# Patient Record
Sex: FEMALE | Race: BLACK OR AFRICAN AMERICAN | NOT HISPANIC OR LATINO | URBAN - METROPOLITAN AREA
[De-identification: names, ages, dates, MRNs, and addresses within clinical notes are randomized per-mention and may not be internally consistent; named-entity substitution may affect disease eponyms.]

---

## 2021-04-15 ENCOUNTER — EMERGENCY (EMERGENCY)
Facility: HOSPITAL | Age: 61
LOS: 1 days | Discharge: HOME | End: 2021-04-15
Attending: EMERGENCY MEDICINE | Admitting: EMERGENCY MEDICINE
Payer: COMMERCIAL

## 2021-04-15 VITALS
HEART RATE: 74 BPM | DIASTOLIC BLOOD PRESSURE: 78 MMHG | RESPIRATION RATE: 18 BRPM | OXYGEN SATURATION: 100 % | SYSTOLIC BLOOD PRESSURE: 109 MMHG | TEMPERATURE: 98 F
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
## 2021-04-15 DIAGNOSIS — I10 ESSENTIAL (PRIMARY) HYPERTENSION: ICD-10-CM

## 2021-04-15 DIAGNOSIS — T78.1XXA OTHER ADVERSE FOOD REACTIONS, NOT ELSEWHERE CLASSIFIED, INITIAL ENCOUNTER: ICD-10-CM

## 2021-04-15 DIAGNOSIS — Z88.5 ALLERGY STATUS TO NARCOTIC AGENT: ICD-10-CM

## 2021-04-15 DIAGNOSIS — L50.9 URTICARIA, UNSPECIFIED: ICD-10-CM
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
## 2021-04-15 DIAGNOSIS — J45.909 UNSPECIFIED ASTHMA, UNCOMPLICATED: ICD-10-CM
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
## 2021-04-15 PROCEDURE — 99284 EMERGENCY DEPT VISIT MOD MDM: CPT

## 2021-04-15 RX ORDER — FAMOTIDINE 10 MG/ML
20 INJECTION INTRAVENOUS ONCE
Refills: 0 | Status: COMPLETED | OUTPATIENT
Start: 2021-04-15 | End: 2021-04-15

## 2021-04-15 RX ADMIN — FAMOTIDINE 20 MILLIGRAM(S): 10 INJECTION INTRAVENOUS at 23:27

## 2021-04-15 NOTE — ED PROVIDER NOTE - PHYSICAL EXAMINATION
CONSTITUTIONAL: WD,WN. NAD.    SKIN: Normal color and turgor. Barely appreciable mild redness to R antecubital fossa.   HEAD: NC/AT.  EYES: Conjunctiva clear. EOMI. PERRL.    ENT: Airway patent, no appreciable lymph or intraoral swelling, uvula not swollen, no elevation of FOM, voice is normal.   RESPIRATORY:  Breathing non-labored. No retractions or accessory muscle use.  Lungs CTA bilat.  CARDIOVASCULAR:  RRR, S1S2. No M/R/G.      GI:  Abdomen soft, nontender.    MSK: Neck supple with painless ROM.  No lower extremity edema or calf tenderness.  No joint swelling or ROM limitation.  NEURO: Alert and oriented; CN II-XII grossly intact. Speech clear. 5/5 strength in all extremities.  Good balance. Steady gait. CONSTITUTIONAL: WD,WN. NAD.    SKIN: Normal color and turgor. Barely appreciable mild redness to R antecubital fossa.   HEAD: NC/AT.  EYES: Conjunctiva clear. EOMI. PERRL.    ENT: Airway patent, no appreciable facial, lip, or intraoral swelling, uvula not swollen, no elevation of FOM, voice is normal.   RESPIRATORY:  Breathing non-labored. No retractions or accessory muscle use.  Lungs CTA bilat.  CARDIOVASCULAR:  RRR, S1S2. No M/R/G.      GI:  Abdomen soft, nontender.    MSK: Neck supple with painless ROM.  No lower extremity edema or calf tenderness.  No joint swelling or ROM limitation.  NEURO: Alert and oriented; CN II-XII grossly intact. Speech clear. 5/5 strength in all extremities.  Good balance. Steady gait.

## 2021-04-15 NOTE — ED PROVIDER NOTE - PROGRESS NOTE DETAILS
krishna: stable. ABC intact. asymptomatic. No further evidence of allergic reaction or recurrence. OP clear, no lip swelling, lungs clear. No rash.  Stable for DC.

## 2021-04-15 NOTE — ED PROVIDER NOTE - CLINICAL SUMMARY MEDICAL DECISION MAKING FREE TEXT BOX
Allergic rxn with oral swelling that resolved after meds given by [facility] and EMS.  Asymptomatic in ED.  Stable during obs period in ED.

## 2021-04-15 NOTE — ED ADULT NURSE NOTE - OBJECTIVE STATEMENT
Pt is a 60 y/o female A&Ox4 BIBA from  s/p "allergic reaction." Pt reports "eating a chocolate bar and drinking peppermint tea," s/p pt developed hives to upper body, tongue swelling and throat pain, denies hx of food allergy. Pt received IM 50mg Benadryl from , IV Solumedrol and Epi-pen from EMS. Upon arrival to ED pt reports tongue selling and sore throat resolved and rash improved. PT talking in clear, full sentences, respirations even and unlabored, airway patent, no tongue swelling noted. EKG in progress. Pt is a 60 y/o female A&Ox4 BIBA from  s/p "allergic reaction." Pt reports "eating a chocolate bar and drinking peppermint tea," s/p pt developed hives to upper body, tongue swelling and throat pain, denies hx of food allergy. Pt received IM 50mg Benadryl from , IV Solumedrol and Epi-pen from EMS. Upon arrival to ED pt reports tongue selling resolved, sore throat and rash improved. Pt denies N/V/D, chest pain, SOB. PT talking in clear, full sentences, respirations even and unlabored, airway patent, no tongue swelling noted. EKG in progress.

## 2021-04-15 NOTE — ED PROVIDER NOTE - NSFOLLOWUPINSTRUCTIONS_ED_ALL_ED_FT
Keep EpiPen with you in case of emergency for severe allergic reaction.  Take prednisone 40 mg once daily for next 4 days.  Take Benadryl 25-50 mg every 8 hours for the next 24 hours.    Follow up with your doctor tomorrow; get referral to allergist.     Return to the Emergency Department if you have any new or worsening symptoms, or if you have any concerns.  ===============================    Allergies    WHAT YOU NEED TO KNOW:    Allergies are an immune system reaction to a substance called an allergen. Your immune system sees the allergen as harmful and attacks it. An allergic reaction can be mild or life-threatening. A life-threatening reaction is called anaphylaxis. Anaphylaxis is a sudden, life-threatening reaction that needs immediate treatment.    DISCHARGE INSTRUCTIONS:    Call 911 for signs or symptoms of anaphylaxis, such as trouble breathing, swelling in your mouth or throat, or wheezing. You may also have itching, a rash, hives, or feel like you are going to faint.    Return to the emergency department if:   •You have tingling in your hands or feet.       •Your skin is red or flushed.       Contact your healthcare provider if:   •You have questions or concerns about your condition or care.          Medicines:   •Antihistamines help decrease itching, sneezing, and swelling. You may take them as a pill or use drops in your nose or eyes.       •Decongestants help your nose feel less stuffy.       •Topical treatments help decrease itching or swelling. You also may be given nasal sprays or eyedrops.       •Epinephrine is used to treat a severe allergic reaction such as anaphylaxis.       •Take your medicine as directed. Contact your healthcare provider if you think your medicine is not helping or if you have side effects. Tell him of her if you are allergic to any medicine. Keep a list of the medicines, vitamins, and herbs you take. Include the amounts, and when and why you take them. Bring the list or the pill bottles to follow-up visits. Carry your medicine list with you in case of an emergency.      Steps to take for signs or symptoms of anaphylaxis:   •Immediately give 1 shot of epinephrine only into the outer thigh muscle.       •Leave the shot in place as directed. Your healthcare provider may recommend you leave it in place for up to 10 seconds before you remove it. This helps make sure all of the epinephrine is delivered.       •Call 911 and go to the emergency department, even if the shot improved symptoms. Do not drive yourself. Bring the used epinephrine shot with you.       Safety precautions to take if you are at risk for anaphylaxis:   •Keep 2 shots of epinephrine with you at all times. You may need a second shot, because epinephrine only works for about 20 minutes and symptoms may return. Your healthcare provider can show you and family members how to give the shot. Check the expiration date every month and replace it before it expires.      •Create an action plan. Your healthcare provider can help you create a written plan that explains the allergy and an emergency plan to treat a reaction. The plan explains when to give a second epinephrine shot if symptoms return or do not improve after the first. Give copies of the action plan and emergency instructions to family members and work staff. Show them how to give a shot of epinephrine.      •Be careful when you exercise. If you have had exercise-induced anaphylaxis, do not exercise right after you eat. Stop exercising right away if you start to develop any signs or symptoms of anaphylaxis. You may first feel tired, warm, or have itchy skin. Hives, swelling, and severe breathing problems may develop if you continue to exercise.      •Carry medical alert identification. Wear medical alert jewelry or carry a card that explains the allergy. Ask your healthcare provider where to get these items.   Medical Alert Jewelry           •Inform all healthcare providers of the allergy. This includes dentists, nurses, doctors, and surgeons.       Manage allergies:   •Use nasal rinses as directed. Rinse with a saline solution daily. This will help clear allergens out of your nose. Use distilled water if possible. You can also boil tap water and let it cool before you use it. Do not use tap water that has not been boiled.      •Do not smoke. Allergy symptoms may decrease if you are not around smoke. Nicotine and other chemicals in cigarettes and cigars can cause lung damage. Ask your healthcare provider for information if you currently smoke and need help to quit. E-cigarettes or smokeless tobacco still contain nicotine. Talk to your healthcare provider before you use these products.       Prevent allergic reactions:   •Do not go outside when pollen counts are high if you have seasonal allergies. Your symptoms may be better if you go outside only in the morning or evening. Use your air conditioner, and change air filters often.       •Avoid dust, fur, and mold. Dust and vacuum your home often. You may want to wear a mask when you vacuum. Keep pets in certain rooms, and bathe them often. Use a dehumidifier (machine that decreases moisture) to help prevent mold.       •Do not use products that contain latex if you have a latex allergy. Use nonlatex gloves if you work in healthcare or in food preparation. Always tell healthcare providers about a latex allergy.       •Avoid areas that attract insects if you have an insect bite or sting allergy. Areas include trash cans, gardens, and picnics. Do not wear bright clothing or strong scents when you will be outside.      •Prevent an allergic reaction caused by food. You may have a reaction if your food is not prepared safely. For example, you could be served food that touched your trigger food during preparation. This is called cross-contamination. Kitchen tools can also cause cross-contamination. You may also eat baked foods that contain a trigger food you do not know about. Ask if the food contains your trigger food before you handle or eat it.      Follow up with your healthcare provider as directed: Write down your questions so you remember to ask them during your visits. When you have an allergic reaction, write down everything you were exposed to in the 2 hours before the reaction. Take that information to your next visit.

## 2021-04-15 NOTE — ED PROVIDER NOTE - PATIENT PORTAL LINK FT
You can access the FollowMyHealth Patient Portal offered by [facility] by registering at the following website: [email or web address]. By joining [facility]’s FollowMyHealth portal, you will also be able to view your health information using other applications (apps) compatible with our system.

## 2021-04-15 NOTE — ED PROVIDER NOTE - OBJECTIVE STATEMENT
60 y/o F pt with PMhx of HTN, asthma, possible food allergies (never identified), and histamine sensitivity presents to ED brought in by EMS after apparent allergic rxn today. Said she ate fruit and nut bar around 6:30PM, and about 15min later developed itching and mild hives to arms and hands. Went to [facility] where she was given Benadryl around 9PM. While she was in the waiting room at [facility], she felt itching in her throat and felt like her tongue and lips were becoming swollen. EMS was called and pt was given epinephrine and IV steroids, she estimates at around 9:30PM. Symptoms have essentially resolved although she still feels slightly itchy on arm. Denies new foods or meds. 60 y/o F pt with PMhx of HTN, asthma, possible food allergies (never identified), and histamine sensitivity presents to ED brought in by EMS after apparent allergic rxn today. Said she ate fruit and nut bar around 8:30PM, and about 15min later developed itching and mild hives to arms and hands. Went to [facility]. While she was in the waiting room at [facility], she felt itching in her throat and felt like her tongue and lips were becoming swollen.  She was given IM Benadryl, EMS was called and pt was then given epinephrine and IV steroids, she estimates at around 9:30PM. Symptoms have essentially resolved although she still feels slightly itchy on arm. Denies new foods or meds. 60 y/o F pt with PMhx of HTN, asthma, possible food allergies (never identified), and histamine sensitivity presents to ED brought in by EMS after apparent allergic rxn today. Said she took Advil and ate fruit and nut bar around 8:30PM, and about 15min later developed itching and mild hives to arms and hands. Went to [facility]. While she was in the waiting room at [facility], she felt itching in her throat and felt like her tongue and lips were becoming swollen.  She was given IM Benadryl, EMS was called and pt was then given epinephrine IM (epi pen) and IV steroids, she estimates at around 9:30PM. Symptoms have essentially resolved although she still feels slightly itchy on arm. Denies new foods or meds.  Has had Advil and same brand fruit-nut bars before without allergic reaction.  Her prior allergic reactions never required intubation.

## 2021-04-16 VITALS
HEART RATE: 80 BPM | DIASTOLIC BLOOD PRESSURE: 72 MMHG | OXYGEN SATURATION: 96 % | SYSTOLIC BLOOD PRESSURE: 145 MMHG | RESPIRATION RATE: 18 BRPM

## 2021-04-16 PROCEDURE — 99284 EMERGENCY DEPT VISIT MOD MDM: CPT | Mod: 25

## 2021-04-16 PROCEDURE — 96374 THER/PROPH/DIAG INJ IV PUSH: CPT

## 2021-04-16 PROCEDURE — 93005 ELECTROCARDIOGRAM TRACING: CPT

## 2021-04-16 RX ORDER — EPINEPHRINE 0.3 MG/.3ML
0.3 INJECTION INTRAMUSCULAR; SUBCUTANEOUS
Qty: 1 | Refills: 0
Start: 2021-04-16

## 2021-04-16 RX ORDER — EPINEPHRINE 0.3 MG/.3ML
0.3 INJECTION INTRAMUSCULAR; SUBCUTANEOUS
Qty: 1 | Refills: 0
Start: 2021-04-16 | End: 2021-04-16

## 2021-04-16 RX ADMIN — Medication 20 MILLIGRAM(S): at 01:45

## 2025-01-06 PROBLEM — J45.909 UNSPECIFIED ASTHMA, UNCOMPLICATED: Chronic | Status: ACTIVE | Noted: 2021-04-20

## 2025-01-06 PROBLEM — I10 ESSENTIAL (PRIMARY) HYPERTENSION: Chronic | Status: ACTIVE | Noted: 2021-04-20

## 2025-01-29 PROBLEM — Z00.00 ENCOUNTER FOR PREVENTIVE HEALTH EXAMINATION: Status: ACTIVE | Noted: 2025-01-29

## 2025-02-12 ENCOUNTER — OUTPATIENT (OUTPATIENT)
Dept: OUTPATIENT SERVICES | Facility: HOSPITAL | Age: 65
LOS: 1 days | End: 2025-02-12

## 2025-02-12 ENCOUNTER — APPOINTMENT (OUTPATIENT)
Dept: MRI IMAGING | Facility: HOSPITAL | Age: 65
End: 2025-02-12
Payer: COMMERCIAL

## 2025-02-12 PROCEDURE — 70553 MRI BRAIN STEM W/O & W/DYE: CPT

## 2025-02-12 PROCEDURE — 70553 MRI BRAIN STEM W/O & W/DYE: CPT | Mod: 26

## 2025-02-12 PROCEDURE — A9585: CPT

## 2025-05-01 ENCOUNTER — APPOINTMENT (OUTPATIENT)
Dept: ENDOCRINOLOGY | Facility: CLINIC | Age: 65
End: 2025-05-01